# Patient Record
Sex: FEMALE | Race: WHITE | URBAN - NONMETROPOLITAN AREA
[De-identification: names, ages, dates, MRNs, and addresses within clinical notes are randomized per-mention and may not be internally consistent; named-entity substitution may affect disease eponyms.]

---

## 2019-12-03 ENCOUNTER — IMPORTED ENCOUNTER (OUTPATIENT)
Dept: URBAN - NONMETROPOLITAN AREA CLINIC 1 | Facility: CLINIC | Age: 69
End: 2019-12-03

## 2019-12-03 PROBLEM — D31.31: Noted: 2019-12-03

## 2019-12-03 PROBLEM — H52.223: Noted: 2019-12-03

## 2019-12-03 PROBLEM — H16.223: Noted: 2019-12-03

## 2019-12-03 PROBLEM — H52.4: Noted: 2019-12-03

## 2019-12-03 PROBLEM — H52.03: Noted: 2019-12-03

## 2019-12-03 PROBLEM — H25.13: Noted: 2019-12-03

## 2019-12-03 PROCEDURE — 92015 DETERMINE REFRACTIVE STATE: CPT

## 2019-12-03 PROCEDURE — 99213 OFFICE O/P EST LOW 20 MIN: CPT

## 2019-12-03 NOTE — PATIENT DISCUSSION
Cataracts OU -  discussed findings w/patient-  no treatment indicated at this time stable-  UV protection recommended-  continue to monitor yearly or prnDES OU -  discussed findings w/patient-  increase Systane Complete at least BID-TID OU-  stressed the importance of using the drops as directed-  if no improvement is seen at next visit will consider long term treatment such as Xiidra Restasis or Worthy Sprout-  continue to monitor yearly or prnChoroidal Nevus OD-  discussed findings w/patient-  no treatment indicated-  stable flat with good color shape and size-  continue to monitor yearly or prnMixed 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

## 2022-04-15 ASSESSMENT — VISUAL ACUITY
OD_SC: 20/25-2
OS_PH: 20/40
OU_CC: 20/30+2
OS_GLARE: 20/50-
OD_GLARE: 20/40
OS_SC: 20/50

## 2022-04-15 ASSESSMENT — TONOMETRY
OS_IOP_MMHG: 16
OD_IOP_MMHG: 16

## 2023-10-31 ENCOUNTER — COMPREHENSIVE EXAM (OUTPATIENT)
Dept: URBAN - NONMETROPOLITAN AREA CLINIC 4 | Facility: CLINIC | Age: 73
End: 2023-10-31

## 2023-10-31 DIAGNOSIS — H52.03: ICD-10-CM

## 2023-10-31 DIAGNOSIS — H25.813: ICD-10-CM

## 2023-10-31 DIAGNOSIS — H52.223: ICD-10-CM

## 2023-10-31 DIAGNOSIS — D31.31: ICD-10-CM

## 2023-10-31 DIAGNOSIS — H43.813: ICD-10-CM

## 2023-10-31 DIAGNOSIS — H16.223: ICD-10-CM

## 2023-10-31 DIAGNOSIS — H18.463: ICD-10-CM

## 2023-10-31 PROCEDURE — 92014 COMPRE OPH EXAM EST PT 1/>: CPT

## 2023-10-31 PROCEDURE — 92015 DETERMINE REFRACTIVE STATE: CPT

## 2023-10-31 PROCEDURE — 92134 CPTRZ OPH DX IMG PST SGM RTA: CPT

## 2023-10-31 ASSESSMENT — VISUAL ACUITY
OS_BAT: 20/60
OD_BAT: 20/50
OS_PH: 20/30
OS_CC: 20/50
OD_CC: 20/30

## 2023-10-31 ASSESSMENT — TONOMETRY
OS_IOP_MMHG: 17
OD_IOP_MMHG: 17

## 2023-11-06 ENCOUNTER — CONSULTATION/EVALUATION (OUTPATIENT)
Dept: URBAN - NONMETROPOLITAN AREA CLINIC 4 | Facility: CLINIC | Age: 73
End: 2023-11-06

## 2023-11-06 DIAGNOSIS — H25.813: ICD-10-CM

## 2023-11-06 PROCEDURE — 92136 OPHTHALMIC BIOMETRY: CPT

## 2023-11-06 PROCEDURE — 92025 CPTRIZED CORNEAL TOPOGRAPHY: CPT

## 2023-11-06 PROCEDURE — 99214 OFFICE O/P EST MOD 30 MIN: CPT

## 2023-11-06 PROCEDURE — 92134 CPTRZ OPH DX IMG PST SGM RTA: CPT

## 2023-11-06 ASSESSMENT — VISUAL ACUITY
OS_BAT: 20/60
OS_CC: 20/50+2
OD_CC: 20/40
OD_SC: 20/50
OS_SC: 20/60
OS_AM: 20/25
OU_CC: J1
OD_PH: 20/30
OU_CC: 20/30
OD_PAM: 20/20
OU_SC: 20/40
OS_PH: 20/30
OD_BAT: 20/50

## 2023-11-06 ASSESSMENT — TONOMETRY
OS_IOP_MMHG: 17
OD_IOP_MMHG: 17

## 2023-11-13 ENCOUNTER — DIAGNOSTICS ONLY (OUTPATIENT)
Dept: URBAN - NONMETROPOLITAN AREA CLINIC 4 | Facility: CLINIC | Age: 73
End: 2023-11-13

## 2023-11-13 DIAGNOSIS — H18.453: ICD-10-CM

## 2023-11-13 PROCEDURE — 92025 CPTRIZED CORNEAL TOPOGRAPHY: CPT

## 2023-12-14 ENCOUNTER — SURGERY/PROCEDURE (OUTPATIENT)
Dept: URBAN - METROPOLITAN AREA SURGERY 3 | Facility: SURGERY | Age: 73
End: 2023-12-14

## 2023-12-14 DIAGNOSIS — H25.812: ICD-10-CM

## 2023-12-14 PROCEDURE — 68841 INSJ RX ELUT IMPLT LAC CANAL: CPT

## 2023-12-14 PROCEDURE — 99199PCV PROF CUSTOM VISION PACKAGE

## 2023-12-14 PROCEDURE — 66984CV REMOVE CATARACT, INSERT LENS, CUSTOM VISION

## 2023-12-15 ENCOUNTER — POST-OP (OUTPATIENT)
Dept: URBAN - NONMETROPOLITAN AREA CLINIC 4 | Facility: CLINIC | Age: 73
End: 2023-12-15

## 2023-12-15 DIAGNOSIS — Z96.1: ICD-10-CM

## 2023-12-15 DIAGNOSIS — H25.811: ICD-10-CM

## 2023-12-15 PROCEDURE — 99213 OFFICE O/P EST LOW 20 MIN: CPT

## 2023-12-15 PROCEDURE — 99024 POSTOP FOLLOW-UP VISIT: CPT

## 2023-12-15 ASSESSMENT — VISUAL ACUITY
OD_BAT: 20/60
OD_PH: 20/30
OD_CC: 20/50+2
OD_PAM: 20/25
OD_SC: 20/60
OS_SC: 20/40

## 2023-12-15 ASSESSMENT — TONOMETRY
OD_IOP_MMHG: 17
OS_IOP_MMHG: 18

## 2023-12-20 ENCOUNTER — SURGERY/PROCEDURE (OUTPATIENT)
Dept: URBAN - METROPOLITAN AREA SURGERY 3 | Facility: SURGERY | Age: 73
End: 2023-12-20

## 2023-12-20 DIAGNOSIS — H25.811: ICD-10-CM

## 2023-12-20 PROCEDURE — 68841 INSJ RX ELUT IMPLT LAC CANAL: CPT

## 2023-12-20 PROCEDURE — 99199PCV PROF CUSTOM VISION PACKAGE

## 2023-12-20 PROCEDURE — 66984CV REMOVE CATARACT, INSERT LENS, CUSTOM VISION

## 2023-12-21 ENCOUNTER — POST-OP (OUTPATIENT)
Dept: URBAN - NONMETROPOLITAN AREA CLINIC 4 | Facility: CLINIC | Age: 73
End: 2023-12-21

## 2023-12-21 DIAGNOSIS — Z96.1: ICD-10-CM

## 2023-12-21 PROCEDURE — 99024 POSTOP FOLLOW-UP VISIT: CPT

## 2023-12-21 ASSESSMENT — TONOMETRY
OS_IOP_MMHG: 16
OD_IOP_MMHG: 24

## 2023-12-21 ASSESSMENT — VISUAL ACUITY
OD_SC: 20/40-1
OS_SC: 20/25-2

## 2023-12-29 ENCOUNTER — POST-OP (OUTPATIENT)
Dept: URBAN - NONMETROPOLITAN AREA CLINIC 4 | Facility: CLINIC | Age: 73
End: 2023-12-29

## 2023-12-29 DIAGNOSIS — Z96.1: ICD-10-CM

## 2023-12-29 PROCEDURE — 99024 POSTOP FOLLOW-UP VISIT: CPT

## 2023-12-29 ASSESSMENT — TONOMETRY
OS_IOP_MMHG: 16
OD_IOP_MMHG: 14

## 2023-12-29 ASSESSMENT — VISUAL ACUITY
OS_SC: 20/20-2
OD_SC: 20/25-2

## 2024-01-18 ENCOUNTER — POST-OP (OUTPATIENT)
Dept: URBAN - NONMETROPOLITAN AREA CLINIC 4 | Facility: CLINIC | Age: 74
End: 2024-01-18

## 2024-01-18 DIAGNOSIS — Z96.1: ICD-10-CM

## 2024-01-18 PROCEDURE — 99024 POSTOP FOLLOW-UP VISIT: CPT

## 2024-01-18 ASSESSMENT — TONOMETRY
OS_IOP_MMHG: 16
OD_IOP_MMHG: 15

## 2024-01-18 ASSESSMENT — VISUAL ACUITY
OS_SC: 20/20-2
OD_SC: 20/20-1

## 2024-04-17 ENCOUNTER — ESTABLISHED PATIENT (OUTPATIENT)
Dept: URBAN - NONMETROPOLITAN AREA CLINIC 4 | Facility: CLINIC | Age: 74
End: 2024-04-17

## 2024-04-17 DIAGNOSIS — H43.813: ICD-10-CM

## 2024-04-17 DIAGNOSIS — H18.453: ICD-10-CM

## 2024-04-17 DIAGNOSIS — D31.31: ICD-10-CM

## 2024-04-17 DIAGNOSIS — Z96.1: ICD-10-CM

## 2024-04-17 DIAGNOSIS — H16.223: ICD-10-CM

## 2024-04-17 PROCEDURE — 92014 COMPRE OPH EXAM EST PT 1/>: CPT

## 2024-04-17 ASSESSMENT — VISUAL ACUITY
OS_SC: 20/30+2
OD_SC: 20/30+1

## 2024-04-17 ASSESSMENT — TONOMETRY
OS_IOP_MMHG: 16
OD_IOP_MMHG: 14

## 2025-04-28 ENCOUNTER — COMPREHENSIVE EXAM (OUTPATIENT)
Age: 75
End: 2025-04-28

## 2025-04-28 DIAGNOSIS — H18.453: ICD-10-CM

## 2025-04-28 DIAGNOSIS — Z96.1: ICD-10-CM

## 2025-04-28 DIAGNOSIS — H43.813: ICD-10-CM

## 2025-04-28 DIAGNOSIS — D31.31: ICD-10-CM

## 2025-04-28 DIAGNOSIS — H16.223: ICD-10-CM

## 2025-04-28 PROCEDURE — 99214 OFFICE O/P EST MOD 30 MIN: CPT
